# Patient Record
Sex: FEMALE | Race: AMERICAN INDIAN OR ALASKA NATIVE
[De-identification: names, ages, dates, MRNs, and addresses within clinical notes are randomized per-mention and may not be internally consistent; named-entity substitution may affect disease eponyms.]

---

## 2018-03-05 NOTE — ANESTHESIA DAY OF SURGERY
Anesthesia Day of Surgery





- Day of Surgery


Patient H&P Reviewed: Yes


Patient is NPO: Yes


Beta Blockers: Yes

## 2018-03-05 NOTE — DISCHARGE SUMMARY
Short Stay Discharge Plan


Activity: advance as tolerated


Weight Bearing Status: Weight Bear as Tolerated


Diet: regular


Follow up with: 


HARI MEEK NP [Primary Care Provider] - 7 Days

## 2018-03-05 NOTE — ANESTHESIA CONSULTATION
Anesthesia Consult and Med Hx


Date of service: 03/05/18





- Airway


Anesthetic Teeth Evaluation: Good


ROM Head & Neck: Adequate


Mental/Hyoid Distance: Adequate


Mallampati Class: Class I


Intubation Access Assessment: Good





- Pulmonary Exam


CTA: Yes





- Cardiac Exam


Cardiac Exam: RRR





- Pre-Operative Health Status


ASA Pre-Surgery Classification: ASA2


Proposed Anesthetic Plan: IV Sedation





- Pulmonary


Hx Smoking: Yes


Hx Asthma: Yes





- Cardiovascular System


Hx Hypertension: Yes


Hx Pacemaker: Yes

## 2018-03-05 NOTE — OPERATIVE REPORT
Operative Report


Operative Report: 


Date: 03/05/2018    


Operative Report: 


Date of procedure: 03/05/2018





Procedure: Esophagogastroduodenoscopy with multiple mucosal biopsies.





Attending physician: Narendra Monaco MD





: Narendra Monaco MD





Indication: Patient is a 57 -year-old female who presented with a history of 

recurrent epigastric pain, heartburn and indigestion.  An upper endoscopy is 

done to assess patient, so that treatment may be directed based on the findings.





Consent: Informed consent was obtained after advising the patient and family 

regarding nature of this procedure, its indications, potential benefits as well 

as possible complications including but not limited to bleeding perforation and 

adverse reaction to medication, infection as well as other cardiopulmonary 

complications.  An informed written and verbal consent was then obtained after 

due opportunity was provided for questions and answers.





Monitoring: Patient was monitored continuously with pulse oximetry and 

electrocardiographic recordings as well as blood pressure recordings.  Vital 

signs remained stable throughout this procedure with no untoward events.





Preoperative assessment: Patient was assessed immediately prior to this 

procedure for capacity to tolerate monitored anesthesia care and moderate 

sedation as well as general anesthesia.  Patient's ASA classification is 2,  

Mallampati class is 2, Hyomental distance is 3.





Instrument: Stantumn video endoscope





Medications: Propofol given intravenously in divided doses.  For details please 

refer to anesthesia records.





Description of procedure: Patient was placed in the left lateral decubitus 

position after achieving sedation, the endoscope was introduced into the 

esophagus under direct vision.  It was then advanced beyond the esophagus into 

the stomach and then beyond the stomach into the duodenum and to the second 

portion of the duodenum.  It was subsequently withdrawn with careful inspection 

of all mucosal surfaces with the following findings.





Findings: Patient had mild erosive esophagitis involving the distal esophagus.. 

Patient has  an irregular Z line at 36 cm.  There was a small diminutive 

sliding hiatal hernia seen on entry into the stomach.  There was erythema in 

the gastric antrum.   The duodenal bulb was slightly deformed.  There was one 

small discrete 0.5 x 0.8 cm ulcer in the duodenal bulb.  There were multiple 

erosions seen in the duodenal bulb.  There was some blood oozing from the 

duodenal bulb.  This was irrigated with no evidence of additional bleeding  .  

Biopsies of the antrum were obtained for histopathology.  The rest of the 

duodenum was normal to second portion.





Impression: Mild erosive esophagitis.


 Irregular Z line. 


Gastric antral erythema 


Hiatal hernia.


Duodenal bulb ulcer.


Duodenitis.





Plan: Continue treatment with proton pump inhibitors.


Follow pathology report.


Direct additional treatment based on the pathology report.


Patient will be observed clinically.  Additional recommendations will be made 

follow-up.

## 2018-07-22 NOTE — EMERGENCY DEPARTMENT REPORT
ED Back Pain/Injury HPI





- General


Chief Complaint: Back Pain/Injury


Stated Complaint: (L) LOWER BACK PAIN/ LEG PAIN


Time Seen by Provider: 07/22/18 10:42


Source: patient


Limitations: No Limitations





- History of Present Illness


Initial Comments: 





This is a 57-year-old female nontoxic, well nourished in appearance, no acute 

signs of distress presents to the ED with c/o of acute on chronic lower back 

pain.  Patient stated that the past 2 days she was cleaning the tub and 

developed this pain.  Patient states has history of sciatica nerve pain which 

is similar symptoms as today.  Patient states that pain radiates through to his 

left lower extremity.  Patient denies any trauma.  Denies any bladder or bowel 

instability.  Patient denies any urinary symptoms.  Denies any fever, chills, 

nausea, vomiting, headache, stiff neck, chest pain or shortness of breath.  

Patient denies any numbness or tingling.  Denies any allergies.  Denies 

significant past medical history.


MD Complaint: back pain


-: days(s) (2)


Similar Symptoms Previously: Yes


Place: home


Radiation: left leg


Severity: mild


Severity scale (0 -10): 8


Quality: aching


Consistency: constant


Improves With: immobilization, supine, sitting upright


Worsens With: walking


Context: while lifting, turning/twisting


Associated Symptoms: denies other symptoms.  denies: confusion, weakness, chest 

pain, numbness, difficulty walking, cough, difficulty urinating, diaphoresis, 

incontinence, fever/chills, constipation, headaches, abdominal pain, loss of 

appetite, malaise, nausea/vomiting, rash, seizure, shortness of breath, syncope





- Related Data


 Home Medications











 Medication  Instructions  Recorded  Confirmed  Last Taken


 


ALBUTEROL Inhaler 2 puff INHALATION PRN 03/05/18 03/05/18 03/04/18


 


Carvedilol 12 mg PO DAILY 03/05/18 03/05/18 03/04/18


 


Losartan 50 mg PO DAILY 03/05/18 03/05/18 03/04/18


 


amLODIPine 50 mg PO DAILY 03/05/18 03/05/18 03/04/18








 Previous Rx's











 Medication  Instructions  Recorded  Last Taken  Type


 


Cyclobenzaprine [Flexeril] 10 mg PO BID PRN #14 tablet 07/22/18 Unknown Rx


 


Ibuprofen [Motrin] 600 mg PO Q8H PRN #30 tablet 07/22/18 Unknown Rx











 Allergies











Allergy/AdvReac Type Severity Reaction Status Date / Time


 


No Known Allergies Allergy   Verified 03/05/18 10:25














ED Review of Systems


ROS: 


Stated complaint: (L) LOWER BACK PAIN/ LEG PAIN


Other details as noted in HPI





Constitutional: denies: chills, fever


Eyes: denies: eye pain, eye discharge, vision change


ENT: denies: ear pain, throat pain


Respiratory: denies: cough, shortness of breath, wheezing


Cardiovascular: denies: chest pain, palpitations


Endocrine: no symptoms reported


Gastrointestinal: denies: abdominal pain, nausea, diarrhea


Genitourinary: denies: urgency, dysuria, discharge


Musculoskeletal: back pain.  denies: joint swelling, arthralgia


Skin: denies: rash, lesions


Neurological: denies: headache, weakness, paresthesias


Psychiatric: denies: anxiety, depression


Hematological/Lymphatic: denies: easy bleeding, easy bruising





ED Past Medical Hx





- Past Medical History


Hx Hypertension: Yes


Hx Congestive Heart Failure: Yes


Hx Asthma: Yes





- Surgical History


Hx Pacemaker: Yes


Hx Appendectomy: Yes





- Social History


Smoking Status: Never Smoker


Substance Use Type: None





- Medications


Home Medications: 


 Home Medications











 Medication  Instructions  Recorded  Confirmed  Last Taken  Type


 


ALBUTEROL Inhaler 2 puff INHALATION PRN 03/05/18 03/05/18 03/04/18 History


 


Carvedilol 12 mg PO DAILY 03/05/18 03/05/18 03/04/18 History


 


Losartan 50 mg PO DAILY 03/05/18 03/05/18 03/04/18 History


 


amLODIPine 50 mg PO DAILY 03/05/18 03/05/18 03/04/18 History


 


Cyclobenzaprine [Flexeril] 10 mg PO BID PRN #14 tablet 07/22/18  Unknown Rx


 


Ibuprofen [Motrin] 600 mg PO Q8H PRN #30 tablet 07/22/18  Unknown Rx














ED Physical Exam





- General


Limitations: No Limitations


General appearance: alert, in no apparent distress





- Head


Head exam: Present: atraumatic, normocephalic





- Eye


Eye exam: Present: normal appearance


Pupils: Present: normal accommodation





- ENT


ENT exam: Present: normal exam, mucous membranes moist





- Neck


Neck exam: Present: normal inspection, full ROM.  Absent: tenderness, 

meningismus, lymphadenopathy





- Respiratory


Respiratory exam: Present: normal lung sounds bilaterally.  Absent: respiratory 

distress, wheezes, rales, rhonchi, stridor, chest wall tenderness, accessory 

muscle use, decreased breath sounds, prolonged expiratory





- Cardiovascular


Cardiovascular Exam: Present: regular rate, normal rhythm, normal heart sounds.

  Absent: bradycardia, tachycardia, irregular rhythm, systolic murmur, 

diastolic murmur, rubs, gallop





- GI/Abdominal


GI/Abdominal exam: Present: soft, normal bowel sounds.  Absent: distended, 

tenderness, guarding, rebound, rigid, diminished bowel sounds





- Rectal


Rectal exam: Present: deferred





- Extremities Exam


Extremities exam: Present: normal inspection, full ROM, normal capillary 

refill.  Absent: tenderness





- Back Exam


Back exam: Present: normal inspection, full ROM, paraspinal tenderness (lumbar 

paraspinal).  Absent: tenderness, CVA tenderness (R), CVA tenderness (L), 

muscle spasm, vertebral tenderness, rash noted





- Expanded Back Exam


  ** Expanded


Back exam: Absent: saddle anesthesia


Back exam: Negative Straight Leg Raising: Left, Right





- Neurological Exam


Neurological exam: Present: alert, oriented X3, normal gait





- Psychiatric


Psychiatric exam: Present: normal affect, normal mood





- Skin


Skin exam: Present: warm, dry, intact, normal color.  Absent: rash





ED Course





 Vital Signs











  07/22/18





  10:17


 


Temperature 98.5 F


 


Pulse Rate 83


 


Respiratory 15





Rate 


 


Blood Pressure 153/91


 


O2 Sat by Pulse 95





Oximetry 














- Reevaluation(s)


Reevaluation #1: 





07/22/18 11:40


Patient is speaking in full sentences with no signs of distress noted.





ED Medical Decision Making





- Medical Decision Making





This is a 57-year-old female that presents with low back strain.  Patient is 

stable was examined by me.  There is no spinal tenderness.  There is no cauda 

equina syndrome during examination.  No bladder or bowel instability. Patient 

received Toradol 30 mg IM in the ED which preceded his symptoms has resolved 

and subsided.  Patient is discharged with muscle relaxant and Motrin.  Patient 

was instructed not to operate any machinery while taking muscle relaxant as 

they cause her drowsiness.  Patient was referred to Follow-up with a primary 

care doctor in 3-5 days or if symptoms worsen and continue return to emergency 

room as soon as possible.  At time of discharge, the patient does not seem 

toxic or ill in appearance.  No acute signs of distress noted.  Patient agrees 

to discharge treatment plan of care.  No further questions noted by the patient.





This chart is dictated with using Dragon Dictation Program


Critical care attestation.: 


If time is entered above; I have spent that time in minutes in the direct care 

of this critically ill patient, excluding procedure time.








ED Disposition


Clinical Impression: 


Low back strain


Qualifiers:


 Encounter type: initial encounter Qualified Code(s): S39.012A - Strain of 

muscle, fascia and tendon of lower back, initial encounter





Disposition: DC-01 TO HOME OR SELFCARE


Is pt being admited?: No


Does the pt Need Aspirin: No


Condition: Stable


Instructions:  Low Back Strain (ED), Cyclobenzaprine (By mouth), Ibuprofen (By 

mouth)


Additional Instructions: 


Follow-up with your primary care doctor in 3-5 days or if symptoms worsen such 

as bladder or bowel stability, chest pain, short of breath, numbness or 

tingling sensation in extremities, headache, dizziness, visual changes, nausea 

vomiting, or abdominal pain, return back to emergency room as was possible.


Take ibuprofen and Flexeril as prescribed.  Do not operate heavy machinery 

while taking Flexeril due to sedation


Prescriptions: 


Cyclobenzaprine [Flexeril] 10 mg PO BID PRN #14 tablet


 PRN Reason: Muscle Spasm


Ibuprofen [Motrin] 600 mg PO Q8H PRN #30 tablet


 PRN Reason: Pain


Referrals: 


PRIMARY CARE,MD [Primary Care Provider] - 3-5 Days


ASUNCION PAIGE MD [Staff Physician] - 3-5 Days


[facility] [Outside] - 3-5 Days


[facility] [Outside] - 3-5 Days


Forms:  Work/School Release Form(ED)

## 2019-02-19 ENCOUNTER — HOSPITAL ENCOUNTER (OUTPATIENT)
Dept: HOSPITAL 5 - CATHLABREC | Age: 59
Discharge: HOME | End: 2019-02-19
Attending: INTERNAL MEDICINE
Payer: MEDICARE

## 2019-02-19 VITALS — SYSTOLIC BLOOD PRESSURE: 126 MMHG | DIASTOLIC BLOOD PRESSURE: 69 MMHG

## 2019-02-19 DIAGNOSIS — I42.0: Primary | ICD-10-CM

## 2019-02-19 DIAGNOSIS — E66.9: ICD-10-CM

## 2019-02-19 DIAGNOSIS — I50.9: ICD-10-CM

## 2019-02-19 DIAGNOSIS — Z90.49: ICD-10-CM

## 2019-02-19 DIAGNOSIS — Z79.01: ICD-10-CM

## 2019-02-19 DIAGNOSIS — I25.10: ICD-10-CM

## 2019-02-19 DIAGNOSIS — Z79.899: ICD-10-CM

## 2019-02-19 DIAGNOSIS — Z98.890: ICD-10-CM

## 2019-02-19 DIAGNOSIS — Z87.891: ICD-10-CM

## 2019-02-19 DIAGNOSIS — K21.9: ICD-10-CM

## 2019-02-19 DIAGNOSIS — E78.00: ICD-10-CM

## 2019-02-19 DIAGNOSIS — Z53.8: ICD-10-CM

## 2019-02-19 DIAGNOSIS — I11.0: ICD-10-CM

## 2019-02-19 LAB
APTT BLD: 22.5 SEC. (ref 24.2–36.6)
BASOPHILS # (AUTO): 0.1 K/MM3 (ref 0–0.1)
BASOPHILS NFR BLD AUTO: 1.4 % (ref 0–1.8)
BUN SERPL-MCNC: 6 MG/DL (ref 7–17)
BUN/CREAT SERPL: 15 %
CALCIUM SERPL-MCNC: 9.3 MG/DL (ref 8.4–10.2)
EOSINOPHIL # BLD AUTO: 0.4 K/MM3 (ref 0–0.4)
EOSINOPHIL NFR BLD AUTO: 8.6 % (ref 0–4.3)
HCT VFR BLD CALC: 37.3 % (ref 30.3–42.9)
HEMOLYSIS INDEX: 74
HGB BLD-MCNC: 12.2 GM/DL (ref 10.1–14.3)
INR PPP: 0.92 (ref 0.87–1.13)
LYMPHOCYTES # BLD AUTO: 1 K/MM3 (ref 1.2–5.4)
LYMPHOCYTES NFR BLD AUTO: 22.1 % (ref 13.4–35)
MCHC RBC AUTO-ENTMCNC: 33 % (ref 30–34)
MCV RBC AUTO: 95 FL (ref 79–97)
MONOCYTES # (AUTO): 0.3 K/MM3 (ref 0–0.8)
MONOCYTES % (AUTO): 6.1 % (ref 0–7.3)
PLATELET # BLD: 267 K/MM3 (ref 140–440)
RBC # BLD AUTO: 3.93 M/MM3 (ref 3.65–5.03)

## 2019-02-19 PROCEDURE — 36415 COLL VENOUS BLD VENIPUNCTURE: CPT

## 2019-02-19 PROCEDURE — 80048 BASIC METABOLIC PNL TOTAL CA: CPT

## 2019-02-19 PROCEDURE — 85730 THROMBOPLASTIN TIME PARTIAL: CPT

## 2019-02-19 PROCEDURE — 85025 COMPLETE CBC W/AUTO DIFF WBC: CPT

## 2019-02-19 PROCEDURE — 93005 ELECTROCARDIOGRAM TRACING: CPT

## 2019-02-19 PROCEDURE — 85610 PROTHROMBIN TIME: CPT

## 2019-02-19 PROCEDURE — 93010 ELECTROCARDIOGRAM REPORT: CPT

## 2019-02-19 NOTE — ANESTHESIA CONSULTATION
Anesthesia Consult and Med Hx


Date of service: 02/19/19





- Airway


Anesthetic Teeth Evaluation: Partials (upper)


ROM Head & Neck: Adequate


Mental/Hyoid Distance: Adequate


Mallampati Class: Class II


Intubation Access Assessment: Probably Good





- Pre-Operative Health Status


ASA Pre-Surgery Classification: ASA3


Proposed Anesthetic Plan: MAC





- Pulmonary


Hx Smoking: Yes (former smoker)


Hx Asthma: Yes


Hx Sleep Apnea: Yes





- Cardiovascular System


Hx Hypertension: Yes


Hx Coronary Artery Disease: No (h/o CHF)


Hx Pacemaker: Yes


Hx Internal Defibrillator: Yes





- Central Nervous System


Hx Psychiatric Problems: No





- Gastrointestinal


Hx Gastroesophageal Reflux Disease: Yes





- Other Systems


Hx Obesity: Yes (BMI 32.6)

## 2023-04-14 ENCOUNTER — OFFICE VISIT (OUTPATIENT)
Dept: URBAN - METROPOLITAN AREA CLINIC 109 | Facility: CLINIC | Age: 63
End: 2023-04-14

## 2023-04-26 ENCOUNTER — LAB OUTSIDE AN ENCOUNTER (OUTPATIENT)
Dept: URBAN - METROPOLITAN AREA CLINIC 109 | Facility: CLINIC | Age: 63
End: 2023-04-26

## 2023-04-26 ENCOUNTER — CLAIMS CREATED FROM THE CLAIM WINDOW (OUTPATIENT)
Dept: URBAN - METROPOLITAN AREA CLINIC 109 | Facility: CLINIC | Age: 63
End: 2023-04-26
Payer: COMMERCIAL

## 2023-04-26 ENCOUNTER — DASHBOARD ENCOUNTERS (OUTPATIENT)
Age: 63
End: 2023-04-26

## 2023-04-26 ENCOUNTER — WEB ENCOUNTER (OUTPATIENT)
Dept: URBAN - METROPOLITAN AREA CLINIC 109 | Facility: CLINIC | Age: 63
End: 2023-04-26

## 2023-04-26 VITALS
TEMPERATURE: 97.5 F | SYSTOLIC BLOOD PRESSURE: 117 MMHG | HEART RATE: 101 BPM | BODY MASS INDEX: 34.82 KG/M2 | HEIGHT: 62 IN | WEIGHT: 189.2 LBS | DIASTOLIC BLOOD PRESSURE: 71 MMHG

## 2023-04-26 DIAGNOSIS — R10.13 EPIGASTRIC ABDOMINAL PAIN: ICD-10-CM

## 2023-04-26 DIAGNOSIS — K46.9 HERNIA: ICD-10-CM

## 2023-04-26 DIAGNOSIS — R13.10 ODYNOPHAGIA: ICD-10-CM

## 2023-04-26 DIAGNOSIS — K59.00 CONSTIPATION, UNSPECIFIED CONSTIPATION TYPE: ICD-10-CM

## 2023-04-26 DIAGNOSIS — K21.9 GASTROESOPHAGEAL REFLUX DISEASE, UNSPECIFIED WHETHER ESOPHAGITIS PRESENT: ICD-10-CM

## 2023-04-26 DIAGNOSIS — Z95.810 CARDIAC DEFIBRILLATOR IN PLACE: ICD-10-CM

## 2023-04-26 PROBLEM — 235595009: Status: ACTIVE | Noted: 2023-04-26

## 2023-04-26 PROBLEM — 14760008: Status: ACTIVE | Noted: 2023-04-26

## 2023-04-26 PROBLEM — 441769002: Status: ACTIVE | Noted: 2023-04-26

## 2023-04-26 PROBLEM — 30233002: Status: ACTIVE | Noted: 2023-04-26

## 2023-04-26 PROCEDURE — 99204 OFFICE O/P NEW MOD 45 MIN: CPT | Performed by: INTERNAL MEDICINE

## 2023-04-26 PROCEDURE — 99204 OFFICE O/P NEW MOD 45 MIN: CPT

## 2023-04-26 RX ORDER — LACTULOSE 10 G/15ML
15 ML AS NEEDED SOLUTION ORAL
Qty: 1800 ML | Refills: 5 | OUTPATIENT
Start: 2023-04-26 | End: 2023-10-23

## 2023-04-26 RX ORDER — PANTOPRAZOLE SODIUM 40 MG/1
1 TABLET TABLET, DELAYED RELEASE ORAL ONCE A DAY
Qty: 30 | Refills: 5 | OUTPATIENT
Start: 2023-04-26

## 2023-04-26 RX ORDER — POLYETHYLENE GLYCOL 3350, SODIUM SULFATE ANHYDROUS, SODIUM BICARBONATE, SODIUM CHLORIDE, POTASSIUM CHLORIDE 236; 22.74; 6.74; 5.86; 2.97 G/4L; G/4L; G/4L; G/4L; G/4L
ML POWDER, FOR SOLUTION ORAL 1
Qty: 1 | Refills: 0 | OUTPATIENT
Start: 2023-04-26 | End: 2023-04-27

## 2023-04-26 NOTE — HPI-TODAY'S VISIT:
Ms. Akers is a 61 y/o F who is here with multiple GI complaints. She c/o constipation, vomiting and generally "sick" feeling. She reports "feeling sick to her stomach" and having to rock back and forth until the pain passes. She c/o much epigastric/chest pain which will cause her to be nauseated and vomit. Denies overt GI bleeding. Pain is worse after eating and reports occasional odynophagia/dysphagia. She states she has "tried everything OTC" w/o help/relief. She also c/o constipation. She states she cannot have BM unless she takes something. She denies overt GI bleeding. Reports "buldge" near her appendectomy scar which has gotten bigger in the past x6 months. She reports occasional pain. Reports defibrillator and CHF, she follows with [facility]. Denies lung issues.

## 2023-04-28 ENCOUNTER — TELEPHONE ENCOUNTER (OUTPATIENT)
Dept: URBAN - METROPOLITAN AREA CLINIC 109 | Facility: CLINIC | Age: 63
End: 2023-04-28

## 2023-05-01 ENCOUNTER — TELEPHONE ENCOUNTER (OUTPATIENT)
Dept: URBAN - METROPOLITAN AREA CLINIC 109 | Facility: CLINIC | Age: 63
End: 2023-05-01

## 2023-05-01 RX ORDER — LACTULOSE 10 G/15ML
15 ML AS NEEDED SOLUTION ORAL
Qty: 1800 ML | Refills: 5 | Status: ACTIVE | COMMUNITY
Start: 2023-04-26 | End: 2023-10-23

## 2023-05-01 RX ORDER — PANTOPRAZOLE SODIUM 40 MG/1
1 TABLET TABLET, DELAYED RELEASE ORAL ONCE A DAY
Qty: 30 | Refills: 5 | Status: ACTIVE | COMMUNITY
Start: 2023-04-26

## 2023-05-01 RX ORDER — LACTULOSE 10 G/15ML
15 ML AS NEEDED SOLUTION ORAL
Qty: 1800 ML | Refills: 5 | OUTPATIENT
Start: 2023-05-01 | End: 2023-10-28

## 2023-05-22 ENCOUNTER — ERX REFILL RESPONSE (OUTPATIENT)
Dept: URBAN - METROPOLITAN AREA CLINIC 109 | Facility: CLINIC | Age: 63
End: 2023-05-22

## 2023-05-22 RX ORDER — PANTOPRAZOLE SODIUM 40 MG/1
TAKE 1 TABLET BY MOUTH EVERY DAY FOR 30 DAYS TABLET, DELAYED RELEASE ORAL
Qty: 30 TABLET | Refills: 5 | OUTPATIENT

## 2023-05-22 RX ORDER — PANTOPRAZOLE SODIUM 40 MG/1
1 TABLET TABLET, DELAYED RELEASE ORAL ONCE A DAY
Qty: 30 | Refills: 5 | OUTPATIENT

## 2023-06-19 ENCOUNTER — ERX REFILL RESPONSE (OUTPATIENT)
Dept: URBAN - METROPOLITAN AREA CLINIC 109 | Facility: CLINIC | Age: 63
End: 2023-06-19

## 2023-06-19 RX ORDER — LACTULOSE 10 G/15ML
15 ML AS NEEDED SOLUTION ORAL
Qty: 1800 ML | Refills: 5 | OUTPATIENT

## 2023-06-19 RX ORDER — LACTULOSE 10 G/15ML
15 ML AS NEEDED ORALLY TWICE DAILY AS NEEDED FOR CONSTIPATION 30 DAYS SOLUTION ORAL
Qty: 1800 MILLILITER | Refills: 5 | OUTPATIENT

## 2023-08-11 ENCOUNTER — OFFICE VISIT (OUTPATIENT)
Dept: URBAN - METROPOLITAN AREA MEDICAL CENTER 16 | Facility: MEDICAL CENTER | Age: 63
End: 2023-08-11
Payer: COMMERCIAL

## 2023-08-11 DIAGNOSIS — K21.9 ACID REFLUX: ICD-10-CM

## 2023-08-11 DIAGNOSIS — K29.60 ADENOPAPILLOMATOSIS GASTRICA: ICD-10-CM

## 2023-08-11 DIAGNOSIS — K29.80 ACUTE DUODENITIS: ICD-10-CM

## 2023-08-11 DIAGNOSIS — Z86.010 ADENOMAS PERSONAL HISTORY OF COLONIC POLYPS: ICD-10-CM

## 2023-08-11 PROCEDURE — G0105 COLORECTAL SCRN; HI RISK IND: HCPCS | Performed by: INTERNAL MEDICINE

## 2023-08-11 PROCEDURE — 43239 EGD BIOPSY SINGLE/MULTIPLE: CPT | Performed by: INTERNAL MEDICINE

## 2023-08-11 RX ORDER — PANTOPRAZOLE SODIUM 40 MG/1
TAKE 1 TABLET BY MOUTH EVERY DAY FOR 30 DAYS TABLET, DELAYED RELEASE ORAL
Qty: 30 TABLET | Refills: 5 | Status: ACTIVE | COMMUNITY

## 2023-08-11 RX ORDER — LACTULOSE 10 G/15ML
15 ML AS NEEDED ORALLY TWICE DAILY AS NEEDED FOR CONSTIPATION 30 DAYS SOLUTION ORAL
Qty: 1800 MILLILITER | Refills: 5 | Status: ACTIVE | COMMUNITY

## 2023-09-13 ENCOUNTER — OFFICE VISIT (OUTPATIENT)
Dept: URBAN - METROPOLITAN AREA CLINIC 109 | Facility: CLINIC | Age: 63
End: 2023-09-13

## 2023-11-15 ENCOUNTER — TELEPHONE ENCOUNTER (OUTPATIENT)
Dept: URBAN - METROPOLITAN AREA CLINIC 109 | Facility: CLINIC | Age: 63
End: 2023-11-15

## 2023-11-15 ENCOUNTER — OFFICE VISIT (OUTPATIENT)
Dept: URBAN - METROPOLITAN AREA CLINIC 109 | Facility: CLINIC | Age: 63
End: 2023-11-15

## 2023-11-15 RX ORDER — PANTOPRAZOLE SODIUM 40 MG/1
TAKE 1 TABLET BY MOUTH EVERY DAY FOR 30 DAYS TABLET, DELAYED RELEASE ORAL
Qty: 30 TABLET | Refills: 5

## 2024-02-14 ENCOUNTER — OV EP (OUTPATIENT)
Dept: URBAN - METROPOLITAN AREA CLINIC 109 | Facility: CLINIC | Age: 64
End: 2024-02-14

## 2024-05-22 ENCOUNTER — ERX REFILL RESPONSE (OUTPATIENT)
Dept: URBAN - METROPOLITAN AREA CLINIC 109 | Facility: CLINIC | Age: 64
End: 2024-05-22

## 2024-05-22 RX ORDER — PANTOPRAZOLE SODIUM 40 MG/1
TAKE 1 TABLET BY MOUTH EVERY DAY TABLET, DELAYED RELEASE ORAL
Qty: 90 TABLET | Refills: 1 | OUTPATIENT

## 2024-05-22 RX ORDER — PANTOPRAZOLE SODIUM 40 MG/1
TAKE 1 TABLET BY MOUTH EVERY DAY FOR 30 DAYS TABLET, DELAYED RELEASE ORAL
Qty: 30 TABLET | Refills: 5 | OUTPATIENT

## 2024-11-13 ENCOUNTER — ERX REFILL RESPONSE (OUTPATIENT)
Dept: URBAN - METROPOLITAN AREA CLINIC 109 | Facility: CLINIC | Age: 64
End: 2024-11-13

## 2024-11-13 RX ORDER — PANTOPRAZOLE SODIUM 40 MG/1
TAKE 1 TABLET BY MOUTH EVERY DAY TABLET, DELAYED RELEASE ORAL
Qty: 90 TABLET | Refills: 1 | OUTPATIENT